# Patient Record
Sex: FEMALE | Race: WHITE
[De-identification: names, ages, dates, MRNs, and addresses within clinical notes are randomized per-mention and may not be internally consistent; named-entity substitution may affect disease eponyms.]

---

## 2018-01-13 ENCOUNTER — HOSPITAL ENCOUNTER (EMERGENCY)
Dept: HOSPITAL 25 - UCCORT | Age: 33
Discharge: HOME | End: 2018-01-13
Payer: COMMERCIAL

## 2018-01-13 VITALS — SYSTOLIC BLOOD PRESSURE: 110 MMHG | DIASTOLIC BLOOD PRESSURE: 60 MMHG

## 2018-01-13 DIAGNOSIS — J02.9: ICD-10-CM

## 2018-01-13 DIAGNOSIS — B34.9: Primary | ICD-10-CM

## 2018-01-13 DIAGNOSIS — R51: ICD-10-CM

## 2018-01-13 PROCEDURE — 99211 OFF/OP EST MAY X REQ PHY/QHP: CPT

## 2018-01-13 PROCEDURE — G0463 HOSPITAL OUTPT CLINIC VISIT: HCPCS

## 2018-01-13 NOTE — UC
Back Pain HPI





- HPI Summary


HPI Summary: 


Per KANDY escobar "L SCIATIC NERVE PAIN SINCE YESTERDAY. USING ICE ALTERNATING WITH 

HEAT WITH MINOR RELIEF." SHe is here w/ her . sx staretd yesterday while 

she was walking while doing chores caring for their 7 horses. Has had this 

before. she is 16 wks pregnant (1st pregnancy). took 1000mgs APAP this AM w/o 

much relief. pain 10/10. pain radiates into left buttocks, down the side of her 

thigh, down her calf to just above ankle. + numbing/tingling. no loss bowel/

bladder. no saddle anesthesia sx. 








- History of Current Complaint


Chief Complaint: UCBackPain


Stated Complaint: SCIATICA


Time Seen by Provider: 01/13/18 14:32





- Allergies/Home Medications


Allergies/Adverse Reactions: 


 Allergies











Allergy/AdvReac Type Severity Reaction Status Date / Time


 


No Known Allergies Allergy   Verified 01/13/18 13:53














PMH/Surg Hx/FS Hx/Imm Hx


Previously Healthy: Yes





- Surgical History


Surgical History: None





- Family History


Known Family History: Positive: Hypertension


Family History: NON CONTRIBUTORY





- Social History


Alcohol Use: Occasionally


Substance Use Type: None


Smoking Status (MU): Never Smoked Tobacco





- Immunization History


Most Recent Influenza Vaccination: NOT CURRENT





Review of Systems


Constitutional: Negative


Skin: Negative


Eyes: Negative


ENT: Negative


Respiratory: Negative


Cardiovascular: Negative


Gastrointestinal: Negative


Genitourinary: Negative


Motor: Negative


Neurovascular: Negative


Musculoskeletal: Negative


Neurological: Negative


Psychological: Negative


Is Patient Immunocompromised?: No


All Other Systems Reviewed And Are Negative: Yes





Physical Exam


Triage Information Reviewed: Yes


Appearance: Well-Nourished, Pain Distress


Vital Signs: 


 Initial Vital Signs











Temp  98.7 F   01/13/18 13:48


 


Pulse  84   01/13/18 13:48


 


Resp  18   01/13/18 13:48


 


BP  110/60   01/13/18 13:48


 


Pulse Ox  100   01/13/18 13:48











Vital Signs Reviewed: Yes


Respiratory Exam: Normal


Respiratory: Positive: Lungs clear


Cardiovascular Exam: Normal


Cardiovascular: Positive: RRR, No Murmur


Musculoskeletal Exam: Normal


Musculoskeletal: Positive: Strength Intact, Other: - spine NT, increased pain w

/ flexion, some relief w/ extension. + ipsilateral SLR, neg contralateral. + 1 B

/L patellar and equal reflexes. Strength intact.


Neurological Exam: Normal


Psychological Exam: Normal


Skin Exam: Normal





Back Pain Course/Dx





- Course


Course Of Treatment: No xrays or muscle relxant d/t pregnancy. can cont w/ 

APAP. showed several exercises. start w/ extensions. states she is in too much 

pain to do  piriformis stretches yet. PT would be a good idea, can ask PCP for 

referral.





- Differential Dx/Diagnosis


Differential Diagnosis/HQI/PQRI: Cauda Equina Syndrome, Compressive Cord 

Syndrome, Strain, Sprain


Provider Diagnoses: Sciatica





Discharge





- Discharge Plan


Condition: Stable


Disposition: HOME


Patient Education Materials:  Sciatica (ED), Piriformis Syndrome (ED), Lower 

Back Exercises (ED)


Referrals: 


Juanis Hair MD [Primary Care Provider] - 


Livan Blair MD [Medical Doctor] - 


Additional Instructions: 


Please continue taking the tylenol for the pain. Ice 20 mins on/20 mins off. We 

talked abuot doing gentle back extensions. As your pain improves over the next 

few days, you can add more stretches in, especially the piriformis stretch I 

showed you.

## 2018-06-28 ENCOUNTER — HOSPITAL ENCOUNTER (INPATIENT)
Dept: HOSPITAL 25 - MCHOBOUT | Age: 33
LOS: 1 days | Discharge: HOME | DRG: 560 | End: 2018-06-29
Attending: ADVANCED PRACTICE MIDWIFE | Admitting: MIDWIFE
Payer: COMMERCIAL

## 2018-06-28 DIAGNOSIS — Z14.1: ICD-10-CM

## 2018-06-28 DIAGNOSIS — Z3A.39: ICD-10-CM

## 2018-06-28 DIAGNOSIS — O36.5930: Primary | ICD-10-CM

## 2018-06-28 PROCEDURE — 36415 COLL VENOUS BLD VENIPUNCTURE: CPT

## 2018-06-28 PROCEDURE — 85025 COMPLETE CBC W/AUTO DIFF WBC: CPT

## 2018-06-28 PROCEDURE — 0HQ9XZZ REPAIR PERINEUM SKIN, EXTERNAL APPROACH: ICD-10-PCS | Performed by: MIDWIFE

## 2018-06-28 PROCEDURE — 4A1HX4Z MONITORING OF PRODUCTS OF CONCEPTION, CARDIAC ELECTRICAL ACTIVITY, EXTERNAL APPROACH: ICD-10-PCS | Performed by: MIDWIFE

## 2018-06-28 PROCEDURE — 0UQMXZZ REPAIR VULVA, EXTERNAL APPROACH: ICD-10-PCS | Performed by: MIDWIFE

## 2018-06-28 NOTE — HP
General Information





- General Information


Maternal Age: 32


Grav: 1


Para: 0


SAB: 0


IEA: 0





Estimated Due Date: 18


Determined By: Early Ultrasound


Gestational Age in Weeks and Days: 39 Weeks and 2 Days


Maternal Blood Type and Rh: A Negative





- Results this Pregnancy


Serology/RPR Result: Non-Reactive


Rubella Result: Immune


HBsAg Result: Negative


HIV Result: Negative


GBS Culture Result: Negative





Past Medical History


Delivery History: See  Records - Primip


Pertinent Past Medical History: Non-Contributory - migrain, back pain


Pertinent Past Surgical History: None


Pertinent Family History: Non-Contributory





- Antepartal Records


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

, Rubella equivocal





Review of Systems


Constitutional: Comfortable


CV Complaint: No


Respiratory: Shortness of Breath: No


Gastrointestinal: No Nausea/Vomiting, Normal Bowel Movement


Genitourinary: Leaking Fluid, No Dysuria, No Bleeding


Musculoskeletal: No Complaint, Contractions


Neurological: No Headache


Fetal Movement: Normal





Exam


Allergies/Adverse Reactions: 


Allergies





No Known Allergies Allergy (Verified 18 13:53)


 











T-98.4, P-78, R-20, BP- 127/71, O2-99





- Measurements


Height: 5 ft 6.75 in


Weight: 107.501 kg


Weight in lbs: 237.504530


Body Mass Index (BMI): 37.3


Pre-Pregnancy Weight: 109.769 kg


Weight Gained This Pregnancy: -5 lbs and 0 ozs





- Exam


Abdomen: No Upper Quadrant Pain


Breast: Breast Exam Deferred


CVA: No CVA Tenderness


Extremities: Edema - trace pedal edema, nonpitting


Heart: Normal Rhythm/Heart Sounds


HEENT: No Significant Findings


Lungs: Clear Bilaterally


Rectal: Rectal Exam Deferred


Reflexes: DTR 2+


Thyroid: No Thyromegaly





- Abdominal Exam


Abdomen Exam: Non-Tender


Abdomen Exam Comment: 





Fundal heaight, S<D, prior suspected IUGR





- Ultrasound/Biophysical Profile


Ultrasound Status: Not Done - sono done in office today, confirmed vertex 

position





Targeted Exam Findings


See L&D Outpatient Visit Provider Note for Findings: N/A


Estimated Fetal Weight: 6#


Membrane Status: SROM


Amniotic Fluid Evaluation: Gross Rupture


Bleeding/Discharge: None - Cervical exam deferred at this time





EFM Findings





- External Fetal Monitor Findings


Baseline Fetal Heart Rate: 140


External Fetal Monitor Findings: Accelerations Present, No Pattern of Variable 

or Late Decelerations, Variability Moderate, Baseline Stable


Contractions: Regular, Mild, 45-90 Seconds


Contraction Frequency: 2-5





Assessment/Plan





- Reason for Visit


Reason for Visit: 





Pt experienced gross ROM of clear fluid at 0550 today. 





- Obstetrical Risk Factors


Risk Factors Comment: 





suspected IUGR





- Plan


Plan: Early Labor





- Date/Time of Admission


Date of Admission: 18


Time of Admission: 09:45

## 2018-06-29 VITALS — DIASTOLIC BLOOD PRESSURE: 68 MMHG | SYSTOLIC BLOOD PRESSURE: 123 MMHG

## 2018-06-29 LAB
BASOPHILS # BLD AUTO: 0 10^3/UL (ref 0–0.2)
EOSINOPHIL # BLD AUTO: 0 10^3/UL (ref 0–0.6)
HCT VFR BLD AUTO: 33 % (ref 35–47)
HGB BLD-MCNC: 11.3 G/DL (ref 12–16)
LYMPHOCYTES # BLD AUTO: 1.2 10^3/UL (ref 1–4.8)
MCH RBC QN AUTO: 32 PG (ref 27–31)
MCHC RBC AUTO-ENTMCNC: 34 G/DL (ref 31–36)
MCV RBC AUTO: 93 FL (ref 80–97)
MONOCYTES # BLD AUTO: 0.4 10^3/UL (ref 0–0.8)
NEUTROPHILS # BLD AUTO: 9.8 10^3/UL (ref 1.5–7.7)
NRBC # BLD AUTO: 0 10^3/UL
NRBC BLD QL AUTO: 0
PLATELET # BLD AUTO: 264 10^3/UL (ref 150–450)
RBC # BLD AUTO: 3.57 10^6/UL (ref 4–5.4)
WBC # BLD AUTO: 11.5 10^3/UL (ref 3.5–10.8)

## 2018-06-29 NOTE — PROCNOTE
[facility] OB: Delivery Note





- Delivery


  ** A


Date of Birth: [birth year]


Time of Birth: 15:30


 Sex: Male


[place] Weight at Birth: 2.814 kg


Apgar Score 1 Minute: 9


Apgar Score 5 Minutes: 10


Gestational Age in Weeks and Days at Delivery: 39 Weeks and 2 Days


Delivery Method: Spontaneous Vaginal


Labor: Spontaneous


Did Patient attempt ?: N/A, No Previous 


Amniotic Fluid: Clear


Estimated Blood Loss: 350


Anesthesia/Analgesia: None, Nitrous-Labor - for 20 minutes only


Delivered By: Ashley Madrid





- Nursery


Level of Nursery: Regular/Bedside





- Perineum


Perineal Injury: Vaginal Laceration, 1st Degree


Perineal Injury Comment: bilat labial with right side only repaired


Perineal Repair: By Delivering Practioner





- Events


Delivery Events of Note: Pitocin Only After Delivery





- Additional Delivery Notes


Additional Delivery Notes: 





Pt admitted to labor and delivery with SROM of clear fluid at 39 2/7 weeks 

gestation. Labor began spontaneously soon after and patient progressed to full 

dilation. She used nitrous oxide briefly but did not like it and was otherwise 

unmedicated. Pushing efforts began spontaneously and after 35 minutes pt 

delivered a viable male infant in LARS position. Infant passed to maternal 

abdomen, short cord noted. Apgars 9 and 10. After cord pulsation ceased cord 

clamped and cut and infant moved to mother's chest. Brisk bleeding noted, 

quickly ceased with fundal massage and administration of oxytocin 10 units IM. 

First degree perineal laceration and right labial laceration repaired in the 

usual fashion with absorbable suture after lidocaine used for local anesthesia 

resulting in hemostasis and tissue integrity. Left labial laceration very 

minimal and not repaired. At this time, mother and baby are stable.